# Patient Record
Sex: FEMALE | Race: WHITE | NOT HISPANIC OR LATINO | Employment: FULL TIME | URBAN - METROPOLITAN AREA
[De-identification: names, ages, dates, MRNs, and addresses within clinical notes are randomized per-mention and may not be internally consistent; named-entity substitution may affect disease eponyms.]

---

## 2017-02-08 ENCOUNTER — HOSPITAL ENCOUNTER (OUTPATIENT)
Dept: RADIOLOGY | Facility: MEDICAL CENTER | Age: 42
End: 2017-02-08
Attending: FAMILY MEDICINE
Payer: COMMERCIAL

## 2017-02-08 PROCEDURE — G0202 SCR MAMMO BI INCL CAD: HCPCS

## 2017-06-23 ENCOUNTER — HOSPITAL ENCOUNTER (OUTPATIENT)
Dept: LAB | Facility: MEDICAL CENTER | Age: 42
End: 2017-06-23
Attending: FAMILY MEDICINE
Payer: COMMERCIAL

## 2017-06-23 ENCOUNTER — PATIENT MESSAGE (OUTPATIENT)
Dept: MEDICAL GROUP | Age: 42
End: 2017-06-23

## 2017-06-23 DIAGNOSIS — E78.2 MIXED HYPERLIPIDEMIA: ICD-10-CM

## 2017-06-23 LAB
CHOLEST SERPL-MCNC: 176 MG/DL (ref 100–199)
HDLC SERPL-MCNC: 52 MG/DL
LDLC SERPL CALC-MCNC: 94 MG/DL
TRIGL SERPL-MCNC: 152 MG/DL (ref 0–149)

## 2017-06-23 PROCEDURE — 36415 COLL VENOUS BLD VENIPUNCTURE: CPT

## 2017-06-23 PROCEDURE — 80061 LIPID PANEL: CPT

## 2017-06-23 RX ORDER — ATORVASTATIN CALCIUM 20 MG/1
TABLET, FILM COATED ORAL
Qty: 90 TAB | Refills: 1 | Status: SHIPPED | OUTPATIENT
Start: 2017-06-23 | End: 2017-12-08 | Stop reason: SDUPTHER

## 2017-10-09 ENCOUNTER — APPOINTMENT (OUTPATIENT)
Dept: SOCIAL WORK | Facility: CLINIC | Age: 42
End: 2017-10-09

## 2017-10-09 PROCEDURE — 90686 IIV4 VACC NO PRSV 0.5 ML IM: CPT | Performed by: REGISTERED NURSE

## 2017-12-08 RX ORDER — ATORVASTATIN CALCIUM 20 MG/1
TABLET, FILM COATED ORAL
Qty: 90 TAB | Refills: 1 | Status: SHIPPED | OUTPATIENT
Start: 2017-12-08 | End: 2018-06-13 | Stop reason: SDUPTHER

## 2017-12-08 NOTE — TELEPHONE ENCOUNTER
Was the patient seen in the last year in this department? Yes 12/30/17    Does patient have an active prescription for medications requested? No     Received Request Via: Pharmacy

## 2018-01-15 ENCOUNTER — OFFICE VISIT (OUTPATIENT)
Dept: URGENT CARE | Facility: CLINIC | Age: 43
End: 2018-01-15
Payer: COMMERCIAL

## 2018-01-15 VITALS
RESPIRATION RATE: 14 BRPM | DIASTOLIC BLOOD PRESSURE: 76 MMHG | BODY MASS INDEX: 36.99 KG/M2 | HEART RATE: 88 BPM | WEIGHT: 222 LBS | OXYGEN SATURATION: 96 % | TEMPERATURE: 98.7 F | SYSTOLIC BLOOD PRESSURE: 118 MMHG | HEIGHT: 65 IN

## 2018-01-15 DIAGNOSIS — J06.9 ACUTE URI: ICD-10-CM

## 2018-01-15 DIAGNOSIS — J01.00 ACUTE MAXILLARY SINUSITIS, RECURRENCE NOT SPECIFIED: ICD-10-CM

## 2018-01-15 DIAGNOSIS — R05.9 COUGH: ICD-10-CM

## 2018-01-15 PROCEDURE — 99214 OFFICE O/P EST MOD 30 MIN: CPT | Performed by: NURSE PRACTITIONER

## 2018-01-15 RX ORDER — AMOXICILLIN AND CLAVULANATE POTASSIUM 875; 125 MG/1; MG/1
1 TABLET, FILM COATED ORAL 2 TIMES DAILY
Qty: 14 TAB | Refills: 0 | Status: SHIPPED | OUTPATIENT
Start: 2018-01-15 | End: 2018-01-22

## 2018-01-15 ASSESSMENT — ENCOUNTER SYMPTOMS
FEVER: 0
COUGH: 1
SINUS PAIN: 1
CHILLS: 0
HEADACHES: 1

## 2018-01-15 NOTE — PROGRESS NOTES
"Subjective:      Gretchen Mercer is a 42 y.o. female who presents with Cough (nasal and chest congestion)    Past Medical History:   Diagnosis Date   • Mitral valve prolapse     stable, used to be on beta-blocker       Social History     Social History   • Marital status:      Spouse name: N/A   • Number of children: N/A   • Years of education: N/A     Occupational History   • Not on file.     Social History Main Topics   • Smoking status: Never Smoker   • Smokeless tobacco: Never Used   • Alcohol use 6.0 oz/week     10 Glasses of wine per week      Comment: daily    • Drug use: No   • Sexual activity: Yes     Partners: Male      Comment:  has vasectomy     Other Topics Concern   • Not on file     Social History Narrative    , lives with  and two children, works for [place] [employer].      Family History   Problem Relation Age of Onset   • Hypertension Mother    • Hyperlipidemia Mother    • Hypertension Father    • Hyperlipidemia Father    • Diabetes Father    • No Known Problems Brother        Allergies: Sulfa drugs              Cough   This is a new problem. The current episode started in the past 7 days. The problem has been unchanged. The problem occurs every few minutes. The cough is non-productive. Associated symptoms include headaches, nasal congestion and postnasal drip. Pertinent negatives include no chills or fever. Nothing aggravates the symptoms. She has tried nothing for the symptoms. The treatment provided no relief.       Review of Systems   Constitutional: Positive for malaise/fatigue. Negative for chills and fever.   HENT: Positive for congestion, postnasal drip and sinus pain.    Respiratory: Positive for cough.    Skin: Negative.    Neurological: Positive for headaches.   All other systems reviewed and are negative.         Objective:     /76   Pulse 88   Temp 37.1 °C (98.7 °F)   Resp 14   Ht 1.651 m (5' 5\")   Wt 100.7 kg (222 lb)   SpO2 96%   BMI 36.94 " kg/m²      Physical Exam   Constitutional: She is oriented to person, place, and time. She appears well-developed and well-nourished.   HENT:   Head: Normocephalic.   Right Ear: External ear normal.   Left Ear: External ear normal.   Tender over the maxillary sinuses  Yellow PND   Eyes: Conjunctivae and EOM are normal. Pupils are equal, round, and reactive to light.   Neck: Normal range of motion. Neck supple.   Cardiovascular: Normal rate and regular rhythm.    Pulmonary/Chest: Effort normal and breath sounds normal.   Musculoskeletal: Normal range of motion.   Neurological: She is alert and oriented to person, place, and time.   Skin: Skin is warm and dry. Capillary refill takes less than 2 seconds.   Psychiatric: She has a normal mood and affect. Her behavior is normal. Judgment and thought content normal.   Vitals reviewed.              Assessment/Plan:     1. Acute maxillary sinusitis, recurrence not specified    - amoxicillin-clavulanate (AUGMENTIN) 875-125 MG Tab; Take 1 Tab by mouth 2 times a day for 7 days.  Dispense: 14 Tab; Refill: 0  -follow up if symptoms persisit or worsen    2. Acute URI    -flonase  -humidifier  -tylenol/motrin PRN  -follow up if symptoms persisit or worsen    3. Cough    -OTC cough medication of choice  -follow up if symptoms persisit or worsen

## 2018-06-14 DIAGNOSIS — Z00.00 PE (PHYSICAL EXAM), ANNUAL: ICD-10-CM

## 2018-08-10 ENCOUNTER — HOSPITAL ENCOUNTER (OUTPATIENT)
Dept: RADIOLOGY | Facility: MEDICAL CENTER | Age: 43
End: 2018-08-10
Attending: FAMILY MEDICINE
Payer: COMMERCIAL

## 2018-08-10 DIAGNOSIS — Z12.31 VISIT FOR SCREENING MAMMOGRAM: ICD-10-CM

## 2018-08-10 PROCEDURE — 77067 SCR MAMMO BI INCL CAD: CPT

## 2019-09-09 ENCOUNTER — HOSPITAL ENCOUNTER (OUTPATIENT)
Dept: RADIOLOGY | Facility: MEDICAL CENTER | Age: 44
End: 2019-09-09
Attending: FAMILY MEDICINE
Payer: COMMERCIAL

## 2019-09-09 DIAGNOSIS — Z12.31 VISIT FOR SCREENING MAMMOGRAM: ICD-10-CM

## 2019-09-09 PROCEDURE — 77063 BREAST TOMOSYNTHESIS BI: CPT

## 2019-09-10 ENCOUNTER — HOSPITAL ENCOUNTER (OUTPATIENT)
Dept: RADIOLOGY | Facility: MEDICAL CENTER | Age: 44
End: 2019-09-10
Attending: FAMILY MEDICINE
Payer: COMMERCIAL

## 2019-09-10 DIAGNOSIS — R92.8 ABNORMAL MAMMOGRAM: ICD-10-CM

## 2019-09-10 PROCEDURE — G0279 TOMOSYNTHESIS, MAMMO: HCPCS | Mod: LT

## 2019-09-11 ENCOUNTER — TELEPHONE (OUTPATIENT)
Dept: MEDICAL GROUP | Age: 44
End: 2019-09-11

## 2019-09-11 NOTE — TELEPHONE ENCOUNTER
----- Message from Aura Jiang M.D. sent at 9/10/2019  4:08 PM PDT -----  Please advise patient that the follow-up mammogram was normal.  I recommend that she has repeat mammogram in 1 year.

## 2019-11-01 ENCOUNTER — HOSPITAL ENCOUNTER (OUTPATIENT)
Dept: RADIOLOGY | Facility: MEDICAL CENTER | Age: 44
End: 2019-11-01
Attending: OBSTETRICS & GYNECOLOGY
Payer: COMMERCIAL

## 2019-11-01 DIAGNOSIS — N94.6 DYSMENORRHEA: ICD-10-CM

## 2019-11-01 DIAGNOSIS — N92.0 EXCESSIVE OR FREQUENT MENSTRUATION: ICD-10-CM

## 2019-11-01 PROCEDURE — 76830 TRANSVAGINAL US NON-OB: CPT

## 2020-01-17 ENCOUNTER — HOSPITAL ENCOUNTER (OUTPATIENT)
Dept: RADIOLOGY | Facility: MEDICAL CENTER | Age: 45
End: 2020-01-17
Attending: FAMILY MEDICINE
Payer: COMMERCIAL

## 2020-01-17 DIAGNOSIS — R31.21 ASYMPTOMATIC MICROSCOPIC HEMATURIA: ICD-10-CM

## 2020-01-17 PROCEDURE — 76775 US EXAM ABDO BACK WALL LIM: CPT

## 2020-08-11 DIAGNOSIS — Z01.810 PRE-OPERATIVE CARDIOVASCULAR EXAMINATION: ICD-10-CM

## 2020-08-11 DIAGNOSIS — Z01.812 PRE-OPERATIVE LABORATORY EXAMINATION: ICD-10-CM

## 2020-08-11 LAB
ANION GAP SERPL CALC-SCNC: 11 MMOL/L (ref 7–16)
BUN SERPL-MCNC: 19 MG/DL (ref 8–22)
CALCIUM SERPL-MCNC: 9.1 MG/DL (ref 8.4–10.2)
CHLORIDE SERPL-SCNC: 102 MMOL/L (ref 96–112)
CO2 SERPL-SCNC: 22 MMOL/L (ref 20–33)
CREAT SERPL-MCNC: 0.93 MG/DL (ref 0.5–1.4)
GLUCOSE SERPL-MCNC: 113 MG/DL (ref 65–99)
POTASSIUM SERPL-SCNC: 3.8 MMOL/L (ref 3.6–5.5)
SODIUM SERPL-SCNC: 135 MMOL/L (ref 135–145)

## 2020-08-11 PROCEDURE — 80048 BASIC METABOLIC PNL TOTAL CA: CPT

## 2020-08-11 PROCEDURE — 36415 COLL VENOUS BLD VENIPUNCTURE: CPT

## 2020-08-11 PROCEDURE — 93005 ELECTROCARDIOGRAM TRACING: CPT

## 2020-08-11 RX ORDER — ROSUVASTATIN CALCIUM 40 MG/1
40 TABLET, COATED ORAL
COMMUNITY
Start: 2020-07-20

## 2020-08-11 RX ORDER — DAPAGLIFLOZIN 10 MG/1
TABLET, FILM COATED ORAL
COMMUNITY
Start: 2020-08-03

## 2020-08-11 RX ORDER — METFORMIN HYDROCHLORIDE 500 MG/1
1000 TABLET, EXTENDED RELEASE ORAL
COMMUNITY
Start: 2020-07-20

## 2020-08-11 RX ORDER — BLOOD SUGAR DIAGNOSTIC
STRIP MISCELLANEOUS
COMMUNITY
Start: 2020-07-21

## 2020-08-12 LAB — EKG IMPRESSION: NORMAL

## 2020-08-12 PROCEDURE — 93010 ELECTROCARDIOGRAM REPORT: CPT | Performed by: INTERNAL MEDICINE

## 2020-08-12 NOTE — OR NURSING
" Reviewed \"Preparing for your procedure\" verbally and copy given to pt. Also given \"fasting\", Pain management\", \"Patient Safety\", \"Speak-up\" handouts. Stated no further questions for surgeon on consent and pointed to surgical site listed on consent.   "

## 2020-08-13 ENCOUNTER — OFFICE VISIT (OUTPATIENT)
Dept: ADMISSIONS | Facility: MEDICAL CENTER | Age: 45
End: 2020-08-13
Attending: ORTHOPAEDIC SURGERY
Payer: COMMERCIAL

## 2020-08-13 DIAGNOSIS — Z01.812 PRE-OPERATIVE LABORATORY EXAMINATION: ICD-10-CM

## 2020-08-13 LAB — COVID ORDER STATUS COVID19: NORMAL

## 2020-08-13 PROCEDURE — U0003 INFECTIOUS AGENT DETECTION BY NUCLEIC ACID (DNA OR RNA); SEVERE ACUTE RESPIRATORY SYNDROME CORONAVIRUS 2 (SARS-COV-2) (CORONAVIRUS DISEASE [COVID-19]), AMPLIFIED PROBE TECHNIQUE, MAKING USE OF HIGH THROUGHPUT TECHNOLOGIES AS DESCRIBED BY CMS-2020-01-R: HCPCS

## 2020-08-15 ENCOUNTER — ANESTHESIA EVENT (OUTPATIENT)
Dept: SURGERY | Facility: MEDICAL CENTER | Age: 45
End: 2020-08-15
Payer: COMMERCIAL

## 2020-08-15 LAB
SARS-COV-2 RNA RESP QL NAA+PROBE: NOTDETECTED
SPECIMEN SOURCE: NORMAL

## 2020-08-17 ENCOUNTER — HOSPITAL ENCOUNTER (OUTPATIENT)
Facility: MEDICAL CENTER | Age: 45
End: 2020-08-17
Attending: ORTHOPAEDIC SURGERY | Admitting: ORTHOPAEDIC SURGERY
Payer: COMMERCIAL

## 2020-08-17 ENCOUNTER — ANESTHESIA (OUTPATIENT)
Dept: SURGERY | Facility: MEDICAL CENTER | Age: 45
End: 2020-08-17
Payer: COMMERCIAL

## 2020-08-17 VITALS
HEIGHT: 66 IN | RESPIRATION RATE: 17 BRPM | HEART RATE: 71 BPM | DIASTOLIC BLOOD PRESSURE: 76 MMHG | WEIGHT: 209.22 LBS | OXYGEN SATURATION: 95 % | SYSTOLIC BLOOD PRESSURE: 121 MMHG | BODY MASS INDEX: 33.62 KG/M2 | TEMPERATURE: 97.5 F

## 2020-08-17 PROBLEM — E11.9 DIABETES MELLITUS, TYPE 2 (HCC): Status: ACTIVE | Noted: 2018-10-23

## 2020-08-17 PROBLEM — I34.1 MITRAL VALVE PROLAPSE: Status: ACTIVE | Noted: 2020-08-17

## 2020-08-17 LAB
GLUCOSE BLD-MCNC: 114 MG/DL (ref 65–99)
HCG UR QL: NEGATIVE

## 2020-08-17 PROCEDURE — 82962 GLUCOSE BLOOD TEST: CPT

## 2020-08-17 PROCEDURE — 160048 HCHG OR STATISTICAL LEVEL 1-5: Performed by: ORTHOPAEDIC SURGERY

## 2020-08-17 PROCEDURE — 160035 HCHG PACU - 1ST 60 MINS PHASE I: Performed by: ORTHOPAEDIC SURGERY

## 2020-08-17 PROCEDURE — 160025 RECOVERY II MINUTES (STATS): Performed by: ORTHOPAEDIC SURGERY

## 2020-08-17 PROCEDURE — 160046 HCHG PACU - 1ST 60 MINS PHASE II: Performed by: ORTHOPAEDIC SURGERY

## 2020-08-17 PROCEDURE — 700102 HCHG RX REV CODE 250 W/ 637 OVERRIDE(OP): Performed by: ANESTHESIOLOGY

## 2020-08-17 PROCEDURE — A9270 NON-COVERED ITEM OR SERVICE: HCPCS | Performed by: ANESTHESIOLOGY

## 2020-08-17 PROCEDURE — 160002 HCHG RECOVERY MINUTES (STAT): Performed by: ORTHOPAEDIC SURGERY

## 2020-08-17 PROCEDURE — 502581 HCHG PACK, SHOULDER ARTHROSCOPY: Performed by: ORTHOPAEDIC SURGERY

## 2020-08-17 PROCEDURE — 700102 HCHG RX REV CODE 250 W/ 637 OVERRIDE(OP)

## 2020-08-17 PROCEDURE — 160026 HCHG SURGERY MINUTES - 1ST 30 MINS LEVEL 1: Performed by: ORTHOPAEDIC SURGERY

## 2020-08-17 PROCEDURE — A9270 NON-COVERED ITEM OR SERVICE: HCPCS

## 2020-08-17 PROCEDURE — 700111 HCHG RX REV CODE 636 W/ 250 OVERRIDE (IP): Performed by: ANESTHESIOLOGY

## 2020-08-17 PROCEDURE — 160009 HCHG ANES TIME/MIN: Performed by: ORTHOPAEDIC SURGERY

## 2020-08-17 PROCEDURE — 64415 NJX AA&/STRD BRCH PLXS IMG: CPT | Performed by: ORTHOPAEDIC SURGERY

## 2020-08-17 PROCEDURE — 501162 HCHG PROBE, VULCAN: Performed by: ORTHOPAEDIC SURGERY

## 2020-08-17 PROCEDURE — 81025 URINE PREGNANCY TEST: CPT

## 2020-08-17 PROCEDURE — 700105 HCHG RX REV CODE 258: Performed by: ORTHOPAEDIC SURGERY

## 2020-08-17 RX ORDER — ONDANSETRON 2 MG/ML
INJECTION INTRAMUSCULAR; INTRAVENOUS PRN
Status: DISCONTINUED | OUTPATIENT
Start: 2020-08-17 | End: 2020-08-17 | Stop reason: SURG

## 2020-08-17 RX ORDER — SODIUM CHLORIDE, SODIUM LACTATE, POTASSIUM CHLORIDE, CALCIUM CHLORIDE 600; 310; 30; 20 MG/100ML; MG/100ML; MG/100ML; MG/100ML
INJECTION, SOLUTION INTRAVENOUS CONTINUOUS
Status: DISCONTINUED | OUTPATIENT
Start: 2020-08-17 | End: 2020-08-17 | Stop reason: HOSPADM

## 2020-08-17 RX ORDER — ONDANSETRON 2 MG/ML
4 INJECTION INTRAMUSCULAR; INTRAVENOUS
Status: DISCONTINUED | OUTPATIENT
Start: 2020-08-17 | End: 2020-08-17 | Stop reason: HOSPADM

## 2020-08-17 RX ORDER — SCOLOPAMINE TRANSDERMAL SYSTEM 1 MG/1
1 PATCH, EXTENDED RELEASE TRANSDERMAL
Status: DISCONTINUED | OUTPATIENT
Start: 2020-08-17 | End: 2020-08-17 | Stop reason: HOSPADM

## 2020-08-17 RX ORDER — ROPIVACAINE HYDROCHLORIDE 5 MG/ML
INJECTION, SOLUTION EPIDURAL; INFILTRATION; PERINEURAL PRN
Status: DISCONTINUED | OUTPATIENT
Start: 2020-08-17 | End: 2020-08-17 | Stop reason: SURG

## 2020-08-17 RX ORDER — KETOROLAC TROMETHAMINE 30 MG/ML
INJECTION, SOLUTION INTRAMUSCULAR; INTRAVENOUS PRN
Status: DISCONTINUED | OUTPATIENT
Start: 2020-08-17 | End: 2020-08-17 | Stop reason: SURG

## 2020-08-17 RX ORDER — ACETAMINOPHEN 500 MG
1000 TABLET ORAL ONCE
Status: COMPLETED | OUTPATIENT
Start: 2020-08-17 | End: 2020-08-17

## 2020-08-17 RX ORDER — SCOLOPAMINE TRANSDERMAL SYSTEM 1 MG/1
PATCH, EXTENDED RELEASE TRANSDERMAL
Status: COMPLETED
Start: 2020-08-17 | End: 2020-08-20

## 2020-08-17 RX ORDER — HALOPERIDOL 5 MG/ML
1 INJECTION INTRAMUSCULAR
Status: DISCONTINUED | OUTPATIENT
Start: 2020-08-17 | End: 2020-08-17 | Stop reason: HOSPADM

## 2020-08-17 RX ORDER — CEFAZOLIN SODIUM 1 G/3ML
INJECTION, POWDER, FOR SOLUTION INTRAMUSCULAR; INTRAVENOUS PRN
Status: DISCONTINUED | OUTPATIENT
Start: 2020-08-17 | End: 2020-08-17 | Stop reason: SURG

## 2020-08-17 RX ORDER — DEXAMETHASONE SODIUM PHOSPHATE 4 MG/ML
INJECTION, SOLUTION INTRA-ARTICULAR; INTRALESIONAL; INTRAMUSCULAR; INTRAVENOUS; SOFT TISSUE PRN
Status: DISCONTINUED | OUTPATIENT
Start: 2020-08-17 | End: 2020-08-17 | Stop reason: SURG

## 2020-08-17 RX ADMIN — ROPIVACAINE HYDROCHLORIDE 5 ML: 5 INJECTION, SOLUTION EPIDURAL; INFILTRATION; PERINEURAL at 09:11

## 2020-08-17 RX ADMIN — SCOLOPAMINE TRANSDERMAL SYSTEM 1 PATCH: 1 PATCH, EXTENDED RELEASE TRANSDERMAL at 09:14

## 2020-08-17 RX ADMIN — ONDANSETRON 4 MG: 2 INJECTION INTRAMUSCULAR; INTRAVENOUS at 09:30

## 2020-08-17 RX ADMIN — ROPIVACAINE HYDROCHLORIDE 5 ML: 5 INJECTION, SOLUTION EPIDURAL; INFILTRATION; PERINEURAL at 09:08

## 2020-08-17 RX ADMIN — ROPIVACAINE HYDROCHLORIDE 5 ML: 5 INJECTION, SOLUTION EPIDURAL; INFILTRATION; PERINEURAL at 09:10

## 2020-08-17 RX ADMIN — CEFAZOLIN 2 MG: 1 INJECTION, POWDER, FOR SOLUTION INTRAVENOUS at 09:24

## 2020-08-17 RX ADMIN — POVIDONE-IODINE 15 ML: 10 SOLUTION TOPICAL at 09:05

## 2020-08-17 RX ADMIN — FENTANYL CITRATE 100 MCG: 50 INJECTION, SOLUTION INTRAMUSCULAR; INTRAVENOUS at 09:05

## 2020-08-17 RX ADMIN — ACETAMINOPHEN 1000 MG: 500 TABLET, FILM COATED ORAL at 09:07

## 2020-08-17 RX ADMIN — KETOROLAC TROMETHAMINE 15 MG: 30 INJECTION, SOLUTION INTRAMUSCULAR at 09:30

## 2020-08-17 RX ADMIN — SODIUM CHLORIDE, POTASSIUM CHLORIDE, SODIUM LACTATE AND CALCIUM CHLORIDE: 600; 310; 30; 20 INJECTION, SOLUTION INTRAVENOUS at 09:06

## 2020-08-17 RX ADMIN — ROPIVACAINE HYDROCHLORIDE 5 ML: 5 INJECTION, SOLUTION EPIDURAL; INFILTRATION; PERINEURAL at 09:09

## 2020-08-17 RX ADMIN — DEXAMETHASONE SODIUM PHOSPHATE 10 MG: 4 INJECTION, SOLUTION INTRAMUSCULAR; INTRAVENOUS at 09:25

## 2020-08-17 RX ADMIN — PROPOFOL 150 MG: 10 INJECTION, EMULSION INTRAVENOUS at 09:25

## 2020-08-17 ASSESSMENT — PAIN SCALES - GENERAL: PAIN_LEVEL: 0

## 2020-08-17 NOTE — OP REPORT
DATE OF SERVICE:  08/17/2020    PREOPERATIVE DIAGNOSES:  Left shoulder adhesive capsulitis and   partial-thickness rotator cuff tear.    POSTOPERATIVE DIAGNOSES:  Left shoulder adhesive capsulitis and   partial-thickness rotator cuff tear.    PROCEDURE:  Left shoulder manipulation under anesthesia.    SURGEON:  Sebastian Del Toro MD    ANESTHESIA:  General with regional block.    BLOOD LOSS:  None.    INDICATION:  The patient is a 45-year-old woman with a history of left   shoulder pain and stiffness.  She has been managed with injections and   physical therapy, but still has restricted motion and ongoing pain.  She has   an MRI showing a small partial-thickness rotator cuff tear.  She is indicated   for manipulation under anesthesia and possible capsular release.    FINDINGS:  Exam under anesthesia revealed 140 degrees of passive flexion,   which was easily corrected to normal with gentle manipulation and lysis of an   adhesion.  This also restored full external rotation in abduction.  There was   still limited passive internal rotation, which was easily corrected to normal   with manipulation in internal rotation and abduction and lysis of a single   adhesion.  There was still some mild restriction of external rotation with the   arm at the side and this stretched time-out easily with manipulation to   correct to normal.  There was no evidence of instability.    PROCEDURE:  An interscalene block was placed in preop holding.  The patient   was brought to the operating room; following induction of general anesthesia,   time-out was performed confirming patient, site and procedure.  Manipulation   was performed as noted above.  First, this was done gently into flexion with   lysis of an adhesion that restored full flexion and full external rotation   with the arm in abduction.  Next, manipulation was done in internal rotation   with the arm in abduction.  There was lysis of an adhesion, which normalized   passive  internal rotation compared to the opposite side.  Next, the arm was   placed at the side and it was manipulated into external rotation, which   restored full passive external rotation at the side symmetric with the   opposite side.  Based on the easy restoration of full range of motion   symmetric with the opposite side with gentle manipulation, it was felt that   arthroscopic intervention was not indicated at this time.  A sling was placed   and the patient was awakened and extubated in the operating room and   transferred to recovery room.  She will start physical therapy this afternoon   to work on maintaining range of motion.       ____________________________________     MD ISH Cunningham / NACHO    DD:  08/17/2020 09:43:50  DT:  08/17/2020 10:00:19    D#:  [id number]  Job#:  [id number]

## 2020-08-17 NOTE — OR NURSING
1111- Discharge instructions provided to patient and . Both verbalized understanding. All questions and concerns addressed. Patient D/Bakari to care of family following an uneventful stay in Stage 2.    Spoke with Sharon Rader to relay/review information

## 2020-08-17 NOTE — ANESTHESIA PREPROCEDURE EVALUATION
45F here for shoulder manipulation and arthroscopy    Allergies   Allergen Reactions   • Sulfa Drugs Hives and Rash     hives      Relevant Problems   ANESTHESIA (within normal limits)      PULMONARY (within normal limits)      CARDIAC   (+) Mitral valve prolapse       (within normal limits)      ENDO   (+) Diabetes mellitus, type 2 (HCC)     Past Medical History:   Diagnosis Date   • Diabetes (HCC)    • Heart burn    • High cholesterol    • Mitral valve prolapse     stable, used to be on beta-blocker   • Pain     left shoulder      No current facility-administered medications on file prior to encounter.      Current Outpatient Medications on File Prior to Encounter   Medication Sig Dispense Refill   • vitamin D, Ergocalciferol, (DRISDOL) 53071 UNITS Cap capsule Take 1 Cap by mouth every 7 days. 12 Cap 0      Past Surgical History:   Procedure Laterality Date   • HARDWARE REMOVAL ORTHO Left 2017    ankle   • KNEE ARTHROSCOPY Right 1997    meniscus repair   • ACL RECONSTRUCTION Right     1990   • ANKLE ORIF Left     2008      Physical Exam    Airway   Mallampati: I  TM distance: >3 FB  Neck ROM: full       Cardiovascular - normal exam     Dental - normal exam           Pulmonary - normal exam     Abdominal    Neurological - normal exam                 Anesthesia Plan    ASA 2       Plan - general and peripheral nerve block     Peripheral nerve block will be post-op pain control  Airway plan will be LMA      Plan Factors:   Patient was previously instructed to abstain from smoking on day of procedure.  Patient did not smoke on day of procedure.      Induction: intravenous    Postoperative Plan: Postoperative administration of opioids is intended.    Pertinent diagnostic labs and testing reviewed    Informed Consent:    Anesthetic plan and risks discussed with patient.

## 2020-08-17 NOTE — OR NURSING
0835: Brought patient back to pre-op and assumed care.  0918: Patient allergies and NPO status verified, home medication reconciliation completed and belongings secured. Patient verbalizes understanding of pain scale, expected course of stay and plan of care. Surgical site verified with patient. IV access established.

## 2020-08-17 NOTE — ANESTHESIA PROCEDURE NOTES
Airway    Date/Time: 8/17/2020 9:26 AM  Performed by: Rama Pedraza M.D.  Authorized by: Rama Pedraza M.D.     Location:  OR  Urgency:  Elective  Indications for Airway Management:  Anesthesia      Spontaneous Ventilation: absent    Sedation Level:  Deep  Preoxygenated: Yes    Patient Position:  Sniffing  Final Airway Type:  Supraglottic airway  Final Supraglottic Airway:  Standard LMA    SGA Size:  4  Cuff Pressure (cm H2O):  40  Airway Seal Pressure (cm H2O):  20  Number of Attempts at Approach:  1   Cuff pressure measured via integrated color-based cuff pressure indicator.

## 2020-08-17 NOTE — DISCHARGE INSTRUCTIONS
ACTIVITY: Rest and take it easy for the first 24 hours.  A responsible adult is recommended to remain with you during that time.  It is normal to feel sleepy.  We encourage you to not do anything that requires balance, judgment or coordination.    MILD FLU-LIKE SYMPTOMS ARE NORMAL. YOU MAY EXPERIENCE GENERALIZED MUSCLE ACHES, THROAT IRRITATION, HEADACHE AND/OR SOME NAUSEA.    FOR 24 HOURS DO NOT:  Drive, operate machinery or run household appliances.  Drink beer or alcoholic beverages.   Make important decisions or sign legal documents.    SPECIAL INSTRUCTIONS:   Ambulate with assistance on Post-op day #0 (Monday, August 17th).  Check circulation, motion, and sensation every 4 hours.  Non-weight bearing with operative arm.  Wear immobilizer until nerve block is worn off.  Go to physical therapy today as scheduled.    DIET: To avoid nausea, slowly advance diet as tolerated, avoiding spicy or greasy foods for the first day.  Add more substantial food to your diet according to your physician's instructions.   INCREASE FLUIDS AND FIBER TO AVOID CONSTIPATION.    FOLLOW-UP APPOINTMENT:  A follow-up appointment should be arranged with your doctor in; call to schedule.    You should CALL YOUR PHYSICIAN if you develop:  Fever greater than 101 degrees F.  Pain not relieved by medication, or persistent nausea or vomiting.  Excessive bleeding (blood soaking through dressing) or unexpected drainage from the wound.  Extreme redness or swelling around the incision site, drainage of pus or foul smelling drainage.  Inability to urinate or empty your bladder within 8 hours.  Problems with breathing or chest pain.    You should call 911 if you develop problems with breathing or chest pain.  If you are unable to contact your doctor or surgical center, you should go to the nearest emergency room or urgent care center.      Dr Del Toro's Physician's telephone #: [phone]    If any questions arise, call your doctor.  If your doctor is not  available, please feel free to call the Surgical Center at [phone].  The Center is open Monday through Friday from 7AM to 7PM.  You can also call the HEALTH HOTLINE open 24 hours/day, 7 days/week and speak to a nurse at [phone], or toll free at [phone].    A registered nurse may call you a few days after your surgery to see how you are doing after your procedure.    MEDICATIONS: Resume taking daily medication.  Take prescribed pain medication with food.  If no medication is prescribed, you may take non-aspirin pain medication if needed.  PAIN MEDICATION CAN BE VERY CONSTIPATING.  Take a stool softener or laxative such as senokot, pericolace, or milk of magnesia if needed.    Prescription given prior to surgery.  No pain medication given in recovery.    If your physician has prescribed pain medication that includes Acetaminophen (Tylenol), do not take additional Acetaminophen (Tylenol) while taking the prescribed medication.    Depression / Suicide Risk    As you are discharged from this [facility] facility, it is important to learn how to keep safe from harming yourself.    Recognize the warning signs:  · Abrupt changes in personality, positive or negative- including increase in energy   · Giving away possessions  · Change in eating patterns- significant weight changes-  positive or negative  · Change in sleeping patterns- unable to sleep or sleeping all the time   · Unwillingness or inability to communicate  · Depression  · Unusual sadness, discouragement and loneliness  · Talk of wanting to die  · Neglect of personal appearance   · Rebelliousness- reckless behavior  · Withdrawal from people/activities they love  · Confusion- inability to concentrate     If you or a loved one observes any of these behaviors or has concerns about self-harm, here's what you can do:  · Talk about it- your feelings and reasons for harming yourself  · Remove any means that you might use to hurt yourself (examples:  "pills, rope, extension cords, firearm)  · Get professional help from the community (Mental Health, Substance Abuse, psychological counseling)  · Do not be alone:Call your Safe Contact- someone whom you trust who will be there for you.  · Call your local CRISIS HOTLINE 343-6799 or 442-411-1979  · Call your local Children's Mobile Crisis Response Team [place] (316) 105-4185 or www.BlockAvenue  · Call the toll free National Suicide Prevention Hotlines   · National Suicide Prevention Lifeline 323-550-NGAM (4586)  [employer] Hope Line Network 800-SUICIDE (076-6435)    Peripheral Nerve Block Discharge Instructions from Same Day Surgery and Inpatient :    What to Expect - Upper Extremity  · You may experience numbness and weakness in {ARM LOCATION PNB:585095}  on the same side as your surgery  · This is normal. For some people, this may be an unpleasant sensation. Be very careful with your numb limb  · Ask for help when you need it  Shoulder Surgery Side Effects  · In addition to numbness and weakness you may experience other symptoms  · Other nerves that are close to those nerves injected can also be affected by local anesthesia  · You may experience a hoarseness in your voice  · Your breathing may feel different  · You may also notice drooping of your eyelid, pupil constriction, and decreased sweating, on the side of your surgery  · All of these side effects are normal and will resolve when the local anesthetic wears off   Prevent Injury  · Protect the limb like a baby  · Beware of exposing your limb to extreme heat or cold or trauma  · The limb may be injured without you noticing because it is numb  · Keep the limb elevated whenever possible  · Do not sleep on the limb  · Change the position of the limb regularly  · Avoid putting pressure on your surgical limb  Pain Control  · The initial block on the day of surgery will make your extremity feel \"numb\"  · Any consecutive injection including prior to discharge " "from the hospital will make your extremity feel \"numb\"  · You may feel an aching or burning when the local anesthesia starts to wear off  · Take pain pills as prescribed by your surgeon  · Call your surgeon or anesthesiologist if you do not have adequate pain control    "

## 2020-08-17 NOTE — ANESTHESIA TIME REPORT
Anesthesia Start and Stop Event Times     Date Time Event    8/17/2020 0910 Ready for Procedure     0919 Anesthesia Start     0941 Anesthesia Stop        Responsible Staff  08/17/20    Name Role Begin End    Rama Pedraza M.D. Anesth 0919 0941        Preop Diagnosis (Free Text):  Pre-op Diagnosis     ADHESIVE CAPSULITIS OF SHOULDER LEFT        Preop Diagnosis (Codes):    Post op Diagnosis  Adhesive capsulitis of left shoulder      Premium Reason  Non-Premium    Comments:

## 2020-08-17 NOTE — OR NURSING
0959: [facility] report from [facility] and assumed care. Pt resting comfortably in the gurney, pain is tolerable, no nausea, awake and alert, on room air, tolerating it well.  1019: Still no pain or nausea, awake and alert, on room air, arm in immobilizer. Report called to [facility] and pt readied for transfer, meets criteria to transfer to Stage 2.

## 2020-08-17 NOTE — OR NURSING
0939: To PACU post left shoulder manipulation w/ block. Pt is awake, breathing spontananeous and unlabored. No pain or nausea.   0953: Remains pain/nausea free. Drinking water w/o issue.

## 2020-08-17 NOTE — ANESTHESIA POSTPROCEDURE EVALUATION
Patient: Gretchen Mercer    Procedure Summary     Date: 08/17/20 Room / Location:  OR  / SURGERY [facility]    Anesthesia Start: 0919 Anesthesia Stop: 0941    Procedure: MANIPULATION, SHOULDER (Left Shoulder) Diagnosis: (ADHESIVE CAPSULITIS OF SHOULDER LEFT)    Surgeon: Sebastian Del Toro M.D. Responsible Provider: Rama Pedraza M.D.    Anesthesia Type: general, peripheral nerve block ASA Status: 2          Final Anesthesia Type: general, peripheral nerve block  Last vitals  BP   Blood Pressure: 121/76    Temp   36.4 °C (97.5 °F)    Pulse   Pulse: 71   Resp   17    SpO2   95 %      Anesthesia Post Evaluation    Patient location during evaluation: PACU  Patient participation: complete - patient participated  Level of consciousness: awake and alert  Pain score: 0    Airway patency: patent  Anesthetic complications: no  Cardiovascular status: hemodynamically stable  Respiratory status: acceptable  Hydration status: euvolemic    PONV: none

## 2020-08-17 NOTE — ANESTHESIA PROCEDURE NOTES
Peripheral Block    Date/Time: 8/17/2020 9:05 AM  Performed by: Rama Pedraza M.D.  Authorized by: Rama Pedraza M.D.     Patient Location:  Pre-op  Start Time:  8/17/2020 9:05 AM  End Time:  8/17/2020 9:10 AM  Reason for Block: at surgeon's request and post-op pain management    patient identified, IV checked, site marked, risks and benefits discussed, surgical consent, monitors and equipment checked, pre-op evaluation and timeout performed    Patient Position:  Supine  Prep: ChloraPrep    Monitoring:  Heart rate, continuous pulse ox and cardiac monitor  Block Region:  Upper Extremity  Upper Extremity - Block Type:  BRACHIAL PLEXUS block, Interscalene approach    Laterality:  Left  Procedures: ultrasound guided  Image captured, interpreted and electronically stored.  Block Type:  Single-shot  Needle Length:  100mm  Needle Gauge:  21 G  Needle Localization:  Ultrasound guidance  Injection Assessment:  Negative aspiration for heme, no paresthesia on injection, incremental injection and local visualized surrounding nerve on ultrasound  Evidence of intravascular injection: No     US Guided Interscalene Brachial Plexus Block   US transducer placed on the neck in oblique plane approximately at the level of C6.  Anterior and Middle Scalene (MSM) muscles identified with nerve trunks identified between the muscles.  Needle inserted lateral to probe and advanced under direct visualization through the MSM into a perineural position.  After negative aspiration, LA injected with ease and visualized surrounding the nerve trunks.

## 2020-08-21 ENCOUNTER — HOSPITAL ENCOUNTER (OUTPATIENT)
Dept: LAB | Facility: MEDICAL CENTER | Age: 45
End: 2020-08-21
Attending: FAMILY MEDICINE
Payer: COMMERCIAL

## 2020-08-21 PROCEDURE — U0003 INFECTIOUS AGENT DETECTION BY NUCLEIC ACID (DNA OR RNA); SEVERE ACUTE RESPIRATORY SYNDROME CORONAVIRUS 2 (SARS-COV-2) (CORONAVIRUS DISEASE [COVID-19]), AMPLIFIED PROBE TECHNIQUE, MAKING USE OF HIGH THROUGHPUT TECHNOLOGIES AS DESCRIBED BY CMS-2020-01-R: HCPCS

## 2020-08-21 PROCEDURE — C9803 HOPD COVID-19 SPEC COLLECT: HCPCS

## 2020-08-22 LAB
SARS-COV-2 RNA RESP QL NAA+PROBE: NOTDETECTED
SPECIMEN SOURCE: NORMAL

## 2020-08-24 LAB — COVID ORDER STATUS COVID19: NORMAL

## 2020-09-12 ENCOUNTER — HOSPITAL ENCOUNTER (OUTPATIENT)
Dept: RADIOLOGY | Facility: MEDICAL CENTER | Age: 45
End: 2020-09-12
Attending: FAMILY MEDICINE
Payer: COMMERCIAL

## 2020-09-12 DIAGNOSIS — Z12.31 VISIT FOR SCREENING MAMMOGRAM: ICD-10-CM

## 2020-09-12 PROCEDURE — 77067 SCR MAMMO BI INCL CAD: CPT

## 2020-12-05 ENCOUNTER — HOSPITAL ENCOUNTER (OUTPATIENT)
Dept: LAB | Facility: MEDICAL CENTER | Age: 45
End: 2020-12-05
Attending: FAMILY MEDICINE
Payer: COMMERCIAL

## 2020-12-05 PROCEDURE — U0003 INFECTIOUS AGENT DETECTION BY NUCLEIC ACID (DNA OR RNA); SEVERE ACUTE RESPIRATORY SYNDROME CORONAVIRUS 2 (SARS-COV-2) (CORONAVIRUS DISEASE [COVID-19]), AMPLIFIED PROBE TECHNIQUE, MAKING USE OF HIGH THROUGHPUT TECHNOLOGIES AS DESCRIBED BY CMS-2020-01-R: HCPCS

## 2020-12-05 PROCEDURE — C9803 HOPD COVID-19 SPEC COLLECT: HCPCS

## 2020-12-06 LAB
COVID ORDER STATUS COVID19: NORMAL
SARS-COV-2 RNA RESP QL NAA+PROBE: NOTDETECTED
SPECIMEN SOURCE: NORMAL

## 2021-11-15 ENCOUNTER — HOSPITAL ENCOUNTER (OUTPATIENT)
Dept: RADIOLOGY | Facility: MEDICAL CENTER | Age: 46
End: 2021-11-15
Attending: FAMILY MEDICINE
Payer: COMMERCIAL

## 2021-11-15 DIAGNOSIS — Z12.31 VISIT FOR SCREENING MAMMOGRAM: ICD-10-CM

## 2021-11-15 PROCEDURE — 77063 BREAST TOMOSYNTHESIS BI: CPT

## 2022-11-16 ENCOUNTER — HOSPITAL ENCOUNTER (OUTPATIENT)
Dept: RADIOLOGY | Facility: MEDICAL CENTER | Age: 47
End: 2022-11-16
Attending: FAMILY MEDICINE
Payer: COMMERCIAL

## 2022-11-16 DIAGNOSIS — Z12.31 VISIT FOR SCREENING MAMMOGRAM: ICD-10-CM

## 2022-11-16 PROCEDURE — 77063 BREAST TOMOSYNTHESIS BI: CPT

## 2024-01-19 ENCOUNTER — APPOINTMENT (OUTPATIENT)
Dept: RADIOLOGY | Facility: MEDICAL CENTER | Age: 49
End: 2024-01-19
Attending: FAMILY MEDICINE
Payer: COMMERCIAL

## 2024-01-19 DIAGNOSIS — Z12.31 VISIT FOR SCREENING MAMMOGRAM: ICD-10-CM

## 2024-01-19 PROCEDURE — 77067 SCR MAMMO BI INCL CAD: CPT

## 2025-01-20 ENCOUNTER — HOSPITAL ENCOUNTER (OUTPATIENT)
Dept: RADIOLOGY | Facility: MEDICAL CENTER | Age: 50
End: 2025-01-20
Attending: FAMILY MEDICINE
Payer: COMMERCIAL

## 2025-01-20 DIAGNOSIS — Z12.31 VISIT FOR SCREENING MAMMOGRAM: ICD-10-CM

## 2025-01-20 PROCEDURE — 77067 SCR MAMMO BI INCL CAD: CPT

## 2025-01-29 ENCOUNTER — HOSPITAL ENCOUNTER (OUTPATIENT)
Dept: RADIOLOGY | Facility: MEDICAL CENTER | Age: 50
End: 2025-01-29
Attending: FAMILY MEDICINE
Payer: COMMERCIAL

## 2025-01-29 DIAGNOSIS — R92.8 ABNORMAL MAMMOGRAM: ICD-10-CM

## 2025-01-29 PROCEDURE — G0279 TOMOSYNTHESIS, MAMMO: HCPCS

## (undated) DEVICE — PROTECTOR ULNA NERVE - (36PR/CA)

## (undated) DEVICE — DRAPE U ORTHOPEDIC - (10/BX)

## (undated) DEVICE — DRAPE LARGE 3 QUARTER - (20/CA)

## (undated) DEVICE — TUBING PUMP WITH CONNECTOR REDEUCE (1EA)

## (undated) DEVICE — SHAVER4.0 AGGRESSIVE + FORMLA (5EA/BX)

## (undated) DEVICE — CHLORAPREP 26 ML APPLICATOR - ORANGE TINT(25/CA)

## (undated) DEVICE — GOWN WARMING STANDARD FLEX - (30/CA)

## (undated) DEVICE — HUMID-VENT HEAT AND MOISTURE EXCHANGE- (50/BX)

## (undated) DEVICE — LACTATED RINGERS INJ 1000 ML - (14EA/CA 60CA/PF)

## (undated) DEVICE — SENSOR SPO2 NEO LNCS ADHESIVE (20/BX) SEE USER NOTES

## (undated) DEVICE — NEEDLE MULTIFIRE (5EA/BX)

## (undated) DEVICE — GLOVE BIOGEL ECLIPSE PF LATEX SIZE 8.0  (50PR/BX)

## (undated) DEVICE — TUBING PATIENT W/CONNECTOR REDEUCE (1EA)

## (undated) DEVICE — WATER IRRIGATION STERILE 1000ML (12EA/CA)

## (undated) DEVICE — CANNULA FULLY THREADED 8 X 75 (5EA/BX)

## (undated) DEVICE — DRAPE SHOULDER FLUID CONTROL - 77 X 85 (10/CA)

## (undated) DEVICE — SHAVER4.0 RESECTOR FORMULA - (5EA/BX)

## (undated) DEVICE — CANISTER SUCTION RIGID RED 1500CC (40EA/CA)

## (undated) DEVICE — GLOVE BIOGEL SZ 8 SURGICAL PF LTX - (50PR/BX 4BX/CA)

## (undated) DEVICE — SUTURE 3-0 ETHILON FS-1 - (36/BX) 30 INCH

## (undated) DEVICE — SUCTION INSTRUMENT YANKAUER BULBOUS TIP W/O VENT (50EA/CA)

## (undated) DEVICE — TUBE CONNECTING SUCTION - CLEAR PLASTIC STERILE 72 IN (50EA/CA)

## (undated) DEVICE — KIT ANESTHESIA W/CIRCUIT & 3/LT BAG W/FILTER (20EA/CA)

## (undated) DEVICE — BAG SPONGE COUNT 10.25 X 32 - BLUE (250/CA)

## (undated) DEVICE — GLOVE, LITE (PAIR)

## (undated) DEVICE — CANNULA THREADED 5X75 (5EA/BX)

## (undated) DEVICE — PROBE VULCAN 90 DEG W/SUCTION

## (undated) DEVICE — SLEEVE SHOULDER DISP(ARTHREX) - (6/BX)

## (undated) DEVICE — SODIUM CHL IRRIGATION 0.9% 1000ML (12EA/CA)

## (undated) DEVICE — MASK ANESTHESIA ADULT  - (100/CA)

## (undated) DEVICE — HEAD HOLDER JUNIOR/ADULT

## (undated) DEVICE — ELECTRODE DUAL RETURN W/ CORD - (50/PK)

## (undated) DEVICE — NEPTUNE 4 PORT MANIFOLD - (20/PK)

## (undated) DEVICE — PACK SHOULDER ARTHROSCOPY SM - (2EA/CA)

## (undated) DEVICE — ELECTRODE 850 FOAM ADHESIVE - HYDROGEL RADIOTRNSPRNT (50/PK)